# Patient Record
Sex: FEMALE | Race: WHITE | Employment: UNEMPLOYED | URBAN - METROPOLITAN AREA
[De-identification: names, ages, dates, MRNs, and addresses within clinical notes are randomized per-mention and may not be internally consistent; named-entity substitution may affect disease eponyms.]

---
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
## 2020-10-26 NOTE — PATIENT INSTRUCTIONS
Abnormal Uterine Bleeding: Care Instructions  Your Care Instructions     Abnormal uterine bleeding is irregular bleeding from the uterus that is longer or heavier than usual or does not occur at your regular time. Sometimes it is caused by changes in hormone levels. It can also be caused by growths in the uterus, such as fibroids or polyps. Sometimes a cause cannot be found. You may have heavy bleeding when you are not expecting your period. Your doctor may suggest a pregnancy test, if you think you are pregnant. Follow-up care is a key part of your treatment and safety. Be sure to make and go to all appointments, and call your doctor if you are having problems. It's also a good idea to know your test results and keep a list of the medicines you take. How can you care for yourself at home? · Be safe with medicines. Take pain medicines exactly as directed. ? If the doctor gave you a prescription medicine for pain, take it as prescribed. ? If you are not taking a prescription pain medicine, ask your doctor if you can take an over-the-counter medicine. · You may be low in iron because of blood loss. Eat a balanced diet that is high in iron and vitamin C. Foods rich in iron include red meat, shellfish, eggs, beans, and leafy green vegetables. Talk to your doctor about whether you need to take iron pills or a multivitamin. When should you call for help? Call 911 anytime you think you may need emergency care. For example, call if:    · You passed out (lost consciousness). Call your doctor now or seek immediate medical care if:    · You have new or worse belly or pelvic pain.     · You have severe vaginal bleeding.     · You feel dizzy or lightheaded, or you feel like you may faint. Watch closely for changes in your health, and be sure to contact your doctor if:    · You think you may be pregnant.     · Your bleeding gets worse.     · You do not get better as expected. Where can you learn more?   Go to http://www.gray.com/  Enter [id number] in the search box to learn more about \"Abnormal Uterine Bleeding: Care Instructions. \"  Current as of: November 8, 2019               Content Version: 12.6  © 2446-9083 [employer]. Care instructions adapted under license by [employer] (which disclaims liability or warranty for this information). If you have questions about a medical condition or this instruction, always ask your healthcare professional. [place] [address] any warranty or liability for your use of this information.

## 2020-10-27 ENCOUNTER — OFFICE VISIT (OUTPATIENT)
Dept: OBGYN CLINIC | Age: 20
End: 2020-10-27
Payer: COMMERCIAL

## 2020-10-27 VITALS
WEIGHT: 126 LBS | BODY MASS INDEX: 18.66 KG/M2 | DIASTOLIC BLOOD PRESSURE: 80 MMHG | HEIGHT: 69 IN | SYSTOLIC BLOOD PRESSURE: 125 MMHG

## 2020-10-27 DIAGNOSIS — N94.6 DYSMENORRHEA: ICD-10-CM

## 2020-10-27 DIAGNOSIS — L70.9 ACNE, UNSPECIFIED ACNE TYPE: Primary | ICD-10-CM

## 2020-10-27 DIAGNOSIS — Z76.89 ENCOUNTER FOR MENSTRUAL REGULATION: ICD-10-CM

## 2020-10-27 PROCEDURE — 99202 OFFICE O/P NEW SF 15 MIN: CPT | Performed by: OBSTETRICS & GYNECOLOGY

## 2020-10-27 RX ORDER — SERTRALINE HYDROCHLORIDE 50 MG/1
TABLET, FILM COATED ORAL
COMMUNITY
Start: 2020-09-13 | End: 2022-10-13

## 2020-10-27 RX ORDER — DROSPIRENONE AND ETHINYL ESTRADIOL 0.02-3(28)
1 KIT ORAL DAILY
Qty: 90 TAB | Refills: 0 | Status: SHIPPED | OUTPATIENT
Start: 2020-10-27 | End: 2021-01-20

## 2020-10-27 NOTE — PROGRESS NOTES
[facility]  [email or web address]  [phone]    Isacc Waddell MD, FACOG       OB/GYN Problem visit    Chief Complaint:   Chief Complaint   Patient presents with    Acne       Last or next WWE is: has not had    History of Present Illness: This is a new problem being evaluated by this provider. The patient is a 20 y.o. No obstetric history on file. female who reports having severe acne and bad cramps with menstrual cycles for several  Years. Denies any pain with cycles. Typically lasting 3 days and are usually light  She reports the symptoms has worsened. Aggravating factors include none. Alleviating factors include none. Declines wwe, just wants consult       She does not have other concerns. LMP: Patient's last menstrual period was 10/27/2020. PFSH:  History reviewed. No pertinent past medical history. History reviewed. No pertinent surgical history. Family History   Problem Relation Age of Onset    No Known Problems Mother     Cancer Neg Hx     Diabetes Neg Hx     Heart Disease Neg Hx     Heart Attack Neg Hx     Hypertension Neg Hx     Asthma Neg Hx     High Cholesterol Neg Hx     Arthritis-rheumatoid Neg Hx     Arthritis-osteo Neg Hx     Stroke Neg Hx     Thyroid Disease Neg Hx     Seizures Neg Hx     Migraines Neg Hx     Rashes/Skin Problems Neg Hx     Kidney Disease Neg Hx      Social History     Tobacco Use    Smoking status: Never Smoker    Smokeless tobacco: Never Used   Substance Use Topics    Alcohol use: Never     Alcohol/week: 0.0 standard drinks     Frequency: Never    Drug use: Never     No Known Allergies  Current Outpatient Medications   Medication Sig    sertraline (ZOLOFT) 50 mg tablet TK 1 T PO QD    drospirenone-ethinyl estradioL (Alessia, 28,) 3-0.02 mg tab Take 1 Tab by mouth daily for 90 days.     fluticasone (FLONASE) 50 mcg/actuation nasal spray 2 sprays each nostril one time daily     No current facility-administered medications for this visit. Review of Systems:  History obtained from the patient  Constitutional: negative for fevers, chills and weight loss  ENT ROS: negative for - hearing change, oral lesions or visual changes  Respiratory: negative for cough, wheezing or dyspnea on exertion  Cardiovascular: negative for chest pain, irregular heart beats, exertional chest pressure/discomfort  Gastrointestinal: negative for dysphagia, nausea and vomiting  Genito-Urinary ROS:  see HPI  Inteument/breast: negative for rash, breast lump and nipple discharge  Musculoskeletal:negative for stiff joints, neck pain and muscle weakness  Endocrine ROS: negative for - breast changes, galactorrhea or temperature intolerance  Hematological and Lymphatic ROS: negative for - blood clots, bruising or swollen lymph nodes    Physical Exam:  Visit Vitals  /80   Ht 5' 9\" (1.753 m)   Wt 126 lb (57.2 kg)   BMI 18.61 kg/m²       GENERAL: alert, well appearing, and in no distress  HEAD: normocephalic, atraumatic. PULM: clear to auscultation, no wheezes, rales or rhonchi, symmetric air entry   COR: normal rate and regular rhythm, S1 and S2 normal   declined  NEURO: alert, oriented, normal speech    Assessment:  Encounter Diagnoses   Name Primary?  Acne, unspecified acne type Yes    Dysmenorrhea     Encounter for menstrual regulation        Plan:  The patient is advised that she should contact the office if she does not note improvement or if symptoms recur  Recommend follow up with PCP for non-gynecologic complaints and chronic medical problems. She should contact our office with any questions or concerns  She could keep her routine annual exam appointment. Discussed risks, benefits and alternatives of OCP/nuvaring/patch: including but not limited to dvt/pe/mi/cva/ca/gi risks and that smoking, increasing age and other health conditions can increase these risks.    We discussed progesterone only and non hormonal options for contraception including but not limited to condoms, IUDs, Nexplanon, and depo provera. OCP fu 3 mos  Discussed risks, benefits and alternatives of OCP/nuvaring/patch: including but not limited to dvt/pe/mi/cva/ca/gi risks. She is encouraged to read package insert and to follow up with me or her pharmacist with any questions or concerns. Disc potential increase risks with tania/dane and interaction with other medications and potassium levels. tania rx sent  ocp ho given     Orders Placed This Encounter    drospirenone-ethinyl estradioL (Tania, 28,) 3-0.02 mg tab       No results found for this visit on 10/27/20.

## 2021-01-20 ENCOUNTER — TELEPHONE (OUTPATIENT)
Dept: OBGYN CLINIC | Age: 21
End: 2021-01-20

## 2021-01-20 RX ORDER — DROSPIRENONE AND ETHINYL ESTRADIOL 0.02-3(28)
1 KIT ORAL DAILY
Qty: 1 PACKAGE | Refills: 0 | Status: SHIPPED | OUTPATIENT
Start: 2021-01-20 | End: 2021-02-02 | Stop reason: SDUPTHER

## 2021-01-20 NOTE — TELEPHONE ENCOUNTER
Patient called to advise that she was not sure if she needed another appointment, she is running low on her ocp. Her last appt on 10/27/20 does state that she needs 3 month follow up.       Scheduled for 2/2/21 9:40 am    Alessia needed to get her through for one month      [employer]

## 2021-02-02 ENCOUNTER — OFFICE VISIT (OUTPATIENT)
Dept: OBGYN CLINIC | Age: 21
End: 2021-02-02
Payer: COMMERCIAL

## 2021-02-02 VITALS
WEIGHT: 129 LBS | SYSTOLIC BLOOD PRESSURE: 112 MMHG | HEART RATE: 77 BPM | HEIGHT: 69 IN | DIASTOLIC BLOOD PRESSURE: 78 MMHG | BODY MASS INDEX: 19.11 KG/M2

## 2021-02-02 DIAGNOSIS — L70.9 ACNE, UNSPECIFIED ACNE TYPE: ICD-10-CM

## 2021-02-02 DIAGNOSIS — Z76.89 ENCOUNTER FOR MENSTRUAL REGULATION: Primary | ICD-10-CM

## 2021-02-02 PROCEDURE — 99213 OFFICE O/P EST LOW 20 MIN: CPT | Performed by: OBSTETRICS & GYNECOLOGY

## 2021-02-02 RX ORDER — DROSPIRENONE AND ETHINYL ESTRADIOL 0.02-3(28)
1 KIT ORAL DAILY
Qty: 3 PACKAGE | Refills: 3 | Status: SHIPPED | OUTPATIENT
Start: 2021-02-02 | End: 2021-03-04

## 2021-03-26 ENCOUNTER — TELEPHONE (OUTPATIENT)
Dept: OBGYN CLINIC | Age: 21
End: 2021-03-26

## 2021-03-26 RX ORDER — DROSPIRENONE AND ETHINYL ESTRADIOL 0.02-3(28)
1 KIT ORAL DAILY
Qty: 3 TAB | Refills: 3 | Status: SHIPPED | OUTPATIENT
Start: 2021-03-26 | End: 2022-06-22

## 2021-03-26 NOTE — TELEPHONE ENCOUNTER
Message left at 3:52PM      23year old patient last seen in the office on 2/2/2021    Patient left a message that her birth control was not at the pharmacy    CG called the patient and patient is changing her pharmacy    Prescription resent as per MD order to patient preferred pharmacy    Patient verbalized understanding

## 2021-09-03 ENCOUNTER — TELEPHONE (OUTPATIENT)
Dept: OBGYN CLINIC | Age: 21
End: 2021-09-03

## 2021-09-03 RX ORDER — DROSPIRENONE AND ETHINYL ESTRADIOL 0.02-3(28)
1 KIT ORAL DAILY
Qty: 30 TABLET | Refills: 3 | Status: SHIPPED | OUTPATIENT
Start: 2021-09-03 | End: 2022-10-13 | Stop reason: SDUPTHER

## 2021-09-03 NOTE — TELEPHONE ENCOUNTER
TP pt    Voicemail received 9/2 @8106  Pt calling to request refill of    drospirenone-ethinyl estradioL (Alessia, Sincere,)     Pt next appt 10/28. Ok to refill?

## 2021-09-03 NOTE — TELEPHONE ENCOUNTER
This RN advised pt of refill status. Pt verbalized understanding.    Rx was sent to pt preferred pharmacy

## 2022-06-22 ENCOUNTER — TELEPHONE (OUTPATIENT)
Dept: OBGYN CLINIC | Age: 22
End: 2022-06-22

## 2022-06-22 RX ORDER — DROSPIRENONE AND ETHINYL ESTRADIOL 0.02-3(28)
1 KIT ORAL DAILY
Qty: 3 DOSE PACK | Refills: 0 | Status: SHIPPED | OUTPATIENT
Start: 2022-06-22 | End: 2022-09-13

## 2022-06-22 NOTE — TELEPHONE ENCOUNTER
Message left at 9:52am      24year old patient last seen in the office on 2/2/2021 for ae      Patient left a message asking for a refill of her ocp    This nurse called the patient back to confirm information        Prescription sent as per MD order to patient confirmed pharmacy to get her to her appointment    90 day supply sent due to insurance coverage    Patient advised of need to keep appointment in order to get further refills. Patient verbalized understanding.

## 2022-09-11 ENCOUNTER — TELEPHONE (OUTPATIENT)
Dept: OBGYN CLINIC | Age: 22
End: 2022-09-11

## 2022-09-12 RX ORDER — DROSPIRENONE AND ETHINYL ESTRADIOL 0.02-3(28)
1 KIT ORAL DAILY
Qty: 84 TABLET | Status: CANCELLED | OUTPATIENT
Start: 2022-09-12

## 2022-09-12 NOTE — TELEPHONE ENCOUNTER
24year old patient last seen in the office on 2/2/2021 for ww    Patient has no showed and cancelled some appointments  ?  Ok to refill has pended to get to her appointment

## 2022-09-13 RX ORDER — DROSPIRENONE AND ETHINYL ESTRADIOL 0.02-3(28)
1 KIT ORAL DAILY
Qty: 1 DOSE PACK | Refills: 0 | Status: SHIPPED | OUTPATIENT
Start: 2022-09-13 | End: 2022-10-13

## 2022-09-13 NOTE — TELEPHONE ENCOUNTER
Patient has appointment on 10/13/2022 for Bruce Rudolph MD  to Me    TP    4:53 PM  Does she have wwe scheduled? If yes, can have 3mos, but no more refils until wwe.      Trp       Prescription sent as per MD order to patient preferred pharmacy to get patient to her scheduled appointment

## 2022-10-13 ENCOUNTER — OFFICE VISIT (OUTPATIENT)
Dept: OBGYN CLINIC | Age: 22
End: 2022-10-13
Payer: COMMERCIAL

## 2022-10-13 VITALS
HEART RATE: 90 BPM | SYSTOLIC BLOOD PRESSURE: 112 MMHG | BODY MASS INDEX: 20.59 KG/M2 | WEIGHT: 139 LBS | DIASTOLIC BLOOD PRESSURE: 71 MMHG | HEIGHT: 69 IN

## 2022-10-13 DIAGNOSIS — Z12.4 CERVICAL CANCER SCREENING: ICD-10-CM

## 2022-10-13 DIAGNOSIS — L70.9 ACNE, UNSPECIFIED ACNE TYPE: ICD-10-CM

## 2022-10-13 DIAGNOSIS — Z71.1 WORRIED WELL: ICD-10-CM

## 2022-10-13 DIAGNOSIS — Z01.419 ENCOUNTER FOR GYNECOLOGICAL EXAMINATION (GENERAL) (ROUTINE) WITHOUT ABNORMAL FINDINGS: Primary | ICD-10-CM

## 2022-10-13 DIAGNOSIS — Z11.3 VENEREAL DISEASE SCREENING: ICD-10-CM

## 2022-10-13 PROCEDURE — 99395 PREV VISIT EST AGE 18-39: CPT | Performed by: OBSTETRICS & GYNECOLOGY

## 2022-10-13 RX ORDER — DROSPIRENONE AND ETHINYL ESTRADIOL 0.02-3(28)
1 KIT ORAL DAILY
Qty: 90 TABLET | Refills: 3 | Status: SHIPPED | OUTPATIENT
Start: 2022-10-13 | End: 2023-01-11

## 2022-10-13 RX ORDER — FLUOXETINE 20 MG/1
20 TABLET ORAL DAILY
COMMUNITY

## 2022-10-13 NOTE — PROGRESS NOTES
[facility]  [email or web address]  [phone]    Bernarda Watts MD, FACOG     Annual Gynecologic Exam:  [facility] <40  Chief Complaint   Patient presents with    Well Woman           Emily Ramirez is a 24 y.o. No obstetric history on file. WHITE/NON- female who presents for an annual well woman exam.  Patient's last menstrual period was 09/22/2022 (approximate). .    She reports the following additional concerns: doing well, on OCP for acne. Never had pelvic exam before & is nervous. Menstrual status:  She does report dysmenorrhea/painful menses. She does report heavy menses. She does not report irregular bleeding. Sexual history and Contraception:  Social History     Substance and Sexual Activity   Sexual Activity Yes    Partners: Female    Birth control/protection: Pill       She does not reports new sexual partner(s) in the last year. Preventive Medicine History:  Her most recent Pap smear result: first pap today    She does not have a history of SMITA 2, 3 or cervical cancer. History reviewed. No pertinent past medical history. OB History   No obstetric history on file. History reviewed. No pertinent surgical history.   Family History   Problem Relation Age of Onset    No Known Problems Mother     Cancer Neg Hx     Diabetes Neg Hx     Heart Disease Neg Hx     Heart Attack Neg Hx     Hypertension Neg Hx     Asthma Neg Hx     High Cholesterol Neg Hx     Arthritis-rheumatoid Neg Hx     OSTEOARTHRITIS Neg Hx     Stroke Neg Hx     Thyroid Disease Neg Hx     Seizures Neg Hx     Migraines Neg Hx     Rashes/Skin Problems Neg Hx     Kidney Disease Neg Hx      Social History     Socioeconomic History    Marital status: SINGLE     Spouse name: Not on file    Number of children: Not on file    Years of education: Not on file    Highest education level: Not on file   Occupational History    Not on file   Tobacco Use    Smoking status: Never    Smokeless tobacco: Never Substance and Sexual Activity    Alcohol use: Yes     Alcohol/week: 4.0 standard drinks     Types: 4 Glasses of wine per week    Drug use: Never    Sexual activity: Yes     Partners: Female     Birth control/protection: Pill   Other Topics Concern    Not on file   Social History Narrative    Not on file     Social Determinants of Health     Financial Resource Strain: Not on file   Food Insecurity: Not on file   Transportation Needs: Not on file   Physical Activity: Not on file   Stress: Not on file   Social Connections: Not on file   Intimate Partner Violence: Not on file   Housing Stability: Not on file       No Known Allergies    Current Outpatient Medications   Medication Sig    FLUoxetine (PROzac) 20 mg tablet Take 20 mg by mouth daily. drospirenone-ethinyl estradioL (SUMEET) 3-0.02 mg tab Take 1 Tablet by mouth daily for 90 days. No current facility-administered medications for this visit. There is no problem list on file for this patient. Review of Systems - History obtained from the patient and patient filled out questionnaire   Constitutional/general, HEENT, CV, Resp, GI, MSK, Neuro, Psych, Heme/lymph, Skin, Breast ROS: no significant complaints except as noted on HPI    Physical Exam  Visit Vitals  /71   Pulse 90   Ht 5' 9\" (1.753 m)   Wt 139 lb (63 kg)   LMP 09/22/2022 (Approximate)   BMI 20.53 kg/m²       Constitutional  Appearance: well-nourished, well developed, alert, in no acute distress    HENT  Head and Face: appears normal    Neck  Inspection/Palpation: normal appearance, no masses or tenderness  Lymph Nodes: no lymphadenopathy present  Thyroid: gland size normal, nontender, no nodules or masses present on palpation    Chest  Respiratory Effort: breathing unlabored  Auscultation: normal breath sounds    Cardiovascular  Heart:   Auscultation: regular rate and rhythm without murmur    Breasts  Inspection of Breasts: breasts symmetrical, no skin changes, no discharge present, nipple appearance normal, no skin retraction present  Palpation of Breasts and Axillae: no masses present on palpation, no breast tenderness  Axillary Lymph Nodes: no lymphadenopathy present    Gastrointestinal  Abdominal Examination: abdomen non-tender to palpation, normal bowel sounds, no masses present  Liver and spleen: no hepatomegaly present, spleen not palpable  Hernias: no hernias identified    Genitourinary  External Genitalia: normal appearance for age, no discharge present, no tenderness present, no inflammatory lesions present, no masses present  Vagina: normal vaginal vault without central or paravaginal defects, minimal white discharge present, no inflammatory lesions present, no masses present  Bladder: non-tender to palpation  Urethra: appears normal  Cervix: normal   Uterus: normal size, shape and consistency  Adnexa: no adnexal tenderness present, no adnexal masses present  Perineum: perineum within normal limits, no evidence of trauma, no rashes or skin lesions present  Anus: anus within normal limits, no hemorrhoids present  Inguinal Lymph Nodes: no lymphadenopathy present    Skin  General Inspection: no rash, no lesions identified    Neurologic/Psychiatric  Mental Status:  Orientation: grossly oriented to person, place and time  Mood and Affect: mood normal, affect appropriate    Assessment:  24 y.o. No obstetric history on file. for well woman exam  Encounter Diagnoses   Name Primary? Encounter for gynecological examination (general) (routine) without abnormal findings Yes    Cervical cancer screening     Venereal disease screening     Acne, unspecified acne type     Worried well        Plan:  The patient was counseled about diet, exercise, healthy lifestyle  We discussed current pap smear and HR HPV testing guidelines.    I recommended follow up one year for routine annual gynecologic exam or sooner prn  Handouts were given to the patient  I recommended follow up with a primary care physician for chronic medical problems and evaluation of non-gynecologic concerns and to please contact our office with any GYN questions or concerns. I recommended testing per CDC guidelines and at patient request.   Discussed risks, benefits and alternatives of OCP/nuvaring/patch: including but not limited to dvt/pe/mi/cva/ca/gi risks. She is encouraged to read package insert and to follow up with me or her pharmacist with any questions or concerns. Disc potential increase risks with sumeet/dane and interaction with other medications and potassium levels. Disc how to restart OCP  STD screening    Folllow up:  [x] return for annual well woman exam in one year or sooner if she is having problems  [] follow up and ultrasound  [] 6 months  [] 3 months  [] 6 weeks   [] 1 month    Orders Placed This Encounter    HEP B SURFACE AG    T PALLIDUM SCREEN W/REFLEX    HIV SCREEN, [address] [place] [address]. W/REFLEX CONFIRM    CBC W/O DIFF    drospirenone-ethinyl estradioL (SUMEET) 3-0.02 mg tab    PAP IG, CT-NG, RFX APTIMA HPV ASCUS ([id number], [id number]) ([facility])       No results found for any visits on 10/13/22.

## 2022-10-15 LAB
ERYTHROCYTE [DISTWIDTH] IN BLOOD BY AUTOMATED COUNT: 12.3 % (ref 11.7–15.4)
HBV SURFACE AG SERPL QL IA: NEGATIVE
HCT VFR BLD AUTO: 38.3 % (ref 34–46.6)
HGB BLD-MCNC: 12.8 G/DL (ref 11.1–15.9)
HIV 1+2 AB+HIV1 P24 AG SERPL QL IA: NON REACTIVE
MCH RBC QN AUTO: 30.5 PG (ref 26.6–33)
MCHC RBC AUTO-ENTMCNC: 33.4 G/DL (ref 31.5–35.7)
MCV RBC AUTO: 91 FL (ref 79–97)
PLATELET # BLD AUTO: 178 X10E3/UL (ref 150–450)
RBC # BLD AUTO: 4.2 X10E6/UL (ref 3.77–5.28)
TREPONEMA PALLIDUM IGG+IGM AB [PRESENCE] IN SERUM OR PLASMA BY IMMUNOASSAY: NON REACTIVE
WBC # BLD AUTO: 3.4 X10E3/UL (ref 3.4–10.8)

## 2022-10-18 LAB
C TRACH RRNA CVX QL NAA+PROBE: NEGATIVE
CYTOLOGIST CVX/VAG CYTO: NORMAL
CYTOLOGY CVX/VAG DOC CYTO: NORMAL
CYTOLOGY CVX/VAG DOC THIN PREP: NORMAL
DX ICD CODE: NORMAL
LABCORP, 190119: NORMAL
Lab: NORMAL
N GONORRHOEA RRNA CVX QL NAA+PROBE: NEGATIVE
OTHER STN SPEC: NORMAL
STAT OF ADQ CVX/VAG CYTO-IMP: NORMAL

## 2022-10-19 NOTE — PROGRESS NOTES
Pap wnl but with yeast  Update pap smear: PMH/HM  Notify patient yeast on pap smear, if [address] message not read. Recommend 3d monistat or similar product for yeast infections or RX: diflucan 150mg x1 if having sx. Ok to observe if not having symptoms.

## 2022-10-21 ENCOUNTER — TELEPHONE (OUTPATIENT)
Dept: OBGYN CLINIC | Age: 22
End: 2022-10-21

## 2022-10-21 NOTE — TELEPHONE ENCOUNTER
----- Message from Luís Herman MD sent at 10/18/2022  9:10 PM EDT -----  Pap wnl but with yeast  Update pap smear: PMH/  Notify patient yeast on pap smear, if [address] message not read. Recommend 3d monistat or similar product for yeast infections or RX: diflucan 150mg x1 if having sx. Ok to observe if not having symptoms.

## 2022-10-21 NOTE — TELEPHONE ENCOUNTER
24year old patient calling back and was advised of MD reviewed lab work and recommendations     Patient verbalized understanding. Result Notes   Derik Yates MD   10/15/2022  9:26 PM EDT Back to Top      Normal, negative STD panel  [address] message sent, if active.   Also no anemia

## 2023-11-01 ENCOUNTER — TELEPHONE (OUTPATIENT)
Age: 23
End: 2023-11-01

## 2023-11-01 RX ORDER — DROSPIRENONE AND ETHINYL ESTRADIOL 0.02-3(28)
1 KIT ORAL DAILY
Qty: 1 PACKET | Refills: 0 | Status: SHIPPED | OUTPATIENT
Start: 2023-11-01

## 2023-11-01 RX ORDER — DROSPIRENONE AND ETHINYL ESTRADIOL 0.02-3(28)
1 KIT ORAL DAILY
Qty: 84 TABLET | OUTPATIENT
Start: 2023-11-01

## 2023-11-01 NOTE — TELEPHONE ENCOUNTER
Two patient identifiers used    20year old patient last seen in the office on 10.13/2022 for ae and patient is scheduled to come back for ae on 11/10/2023 at 1:00pm    Patient calling to ask for refill of her ocp    Patient was advised of need for appointment for ae and was placed on the schedule to be seen as noted above      Prescription refill sent as per MD order to get patient to her scheduled appointment    Patient advised of need to keep appointment in order to get further refills    Patient verbalized understanding.

## 2023-11-16 ENCOUNTER — OFFICE VISIT (OUTPATIENT)
Age: 23
End: 2023-11-16
Payer: COMMERCIAL

## 2023-11-16 VITALS — WEIGHT: 137 LBS | BODY MASS INDEX: 20.23 KG/M2 | SYSTOLIC BLOOD PRESSURE: 124 MMHG | DIASTOLIC BLOOD PRESSURE: 74 MMHG

## 2023-11-16 DIAGNOSIS — Z11.3 VENEREAL DISEASE SCREENING: ICD-10-CM

## 2023-11-16 DIAGNOSIS — Z01.419 ENCOUNTER FOR GYNECOLOGICAL EXAMINATION: Primary | ICD-10-CM

## 2023-11-16 DIAGNOSIS — N94.6 DYSMENORRHEA: ICD-10-CM

## 2023-11-16 DIAGNOSIS — Z76.89 ENCOUNTER FOR MENSTRUAL REGULATION: ICD-10-CM

## 2023-11-16 PROCEDURE — 99395 PREV VISIT EST AGE 18-39: CPT | Performed by: OBSTETRICS & GYNECOLOGY

## 2023-11-16 RX ORDER — DROSPIRENONE AND ETHINYL ESTRADIOL 0.02-3(28)
1 KIT ORAL DAILY
Qty: 3 PACKET | Refills: 4 | Status: SHIPPED | OUTPATIENT
Start: 2023-11-16

## 2023-11-16 NOTE — PROGRESS NOTES
Laura Rios is a 24 y.o. female returns for an annual exam     Chief Complaint   Patient presents with    Annual Exam       Patient's last menstrual period was 10/26/2023. Her periods are heavy in flow and usually regular with a 26-32 day interval with 3-7 day duration. She has dysmenorrhea. Problems: problems - Patient reports running out of OCP and since has been having heavier and more painful cycles; Patient does report one new sexual partner in the last year but no concerns for infections  Birth Control: None currently. Was on Cecy and would like refills. Last Pap: normal obtained 1 year(s) ago. She does not have a history of CHAVA 2, 3 or cervical cancer. With regard to the Gardisil vaccine, she has not received it yet    n chaperoned by Wenceslao Medina LPN.
appearance, no masses or tenderness  Lymph Nodes: no lymphadenopathy present  Thyroid: gland size normal, nontender, no nodules or masses present on palpation    Chest  Respiratory Effort: breathing unlabored  Auscultation: normal breath sounds    Cardiovascular  Heart: Auscultation: regular rate and rhythm without murmur    Breasts  Inspection of Breasts: breasts symmetrical, no skin changes, no discharge present, nipple appearance normal, no skin retraction present  Palpation of Breasts and Axillae: no masses present on palpation, no breast tenderness  Axillary Lymph Nodes: no lymphadenopathy present    Gastrointestinal  Abdominal Examination: abdomen non-tender to palpation, normal bowel sounds, no masses present  Liver and spleen: no hepatomegaly present, spleen not palpable  Hernias: no hernias identified    Genitourinary  External Genitalia: normal appearance for age, no discharge present, no tenderness present, no inflammatory lesions present, no masses present  Vagina: normal vaginal vault  Bladder: non-tender to palpation  Urethra: appears normal  Cervix: normal   Uterus: normal size, shape and consistency  Adnexa: no adnexal tenderness present, no adnexal masses present  Perineum: perineum within normal limits, no evidence of trauma, no rashes or skin lesions present  Anus: anus within normal limits, no hemorrhoids present  Inguinal Lymph Nodes: no lymphadenopathy present    Skin  General Inspection: no rash, no lesions identified    Neurologic/Psychiatric  Mental Status:  Orientation: grossly oriented to person, place and time  Mood and Affect: mood normal, affect appropriate    Assessment:  24 y.o. No obstetric history on file. for well woman exam  Her current medical status is satisfactory with no evidence of significant gynecologic issues. Encounter Diagnoses   Name Primary?     Encounter for gynecological examination Yes    Venereal disease screening     Encounter for menstrual regulation

## 2023-11-17 LAB
HBV SURFACE AG SER QL: <0.1 INDEX
HBV SURFACE AG SER QL: NEGATIVE
HIV 1+2 AB+HIV1 P24 AG SERPL QL IA: NONREACTIVE
HIV 1/2 RESULT COMMENT: NORMAL

## 2023-11-19 LAB — T PALLIDUM AB SER QL IA: NON REACTIVE

## 2023-11-20 LAB
C TRACH RRNA SPEC QL NAA+PROBE: NEGATIVE
N GONORRHOEA RRNA SPEC QL NAA+PROBE: NEGATIVE
T VAGINALIS RRNA SPEC QL NAA+PROBE: NEGATIVE

## 2023-12-07 DIAGNOSIS — Z01.419 ENCOUNTER FOR GYNECOLOGICAL EXAMINATION: ICD-10-CM

## 2023-12-07 RX ORDER — DROSPIRENONE AND ETHINYL ESTRADIOL 0.02-3(28)
1 KIT ORAL DAILY
Qty: 28 TABLET | OUTPATIENT
Start: 2023-12-07